# Patient Record
Sex: MALE | Race: WHITE
[De-identification: names, ages, dates, MRNs, and addresses within clinical notes are randomized per-mention and may not be internally consistent; named-entity substitution may affect disease eponyms.]

---

## 2018-12-15 ENCOUNTER — HOSPITAL ENCOUNTER (OUTPATIENT)
Dept: HOSPITAL 47 - EC | Age: 63
Setting detail: OBSERVATION
LOS: 1 days | Discharge: HOME | End: 2018-12-16
Attending: INTERNAL MEDICINE | Admitting: INTERNAL MEDICINE
Payer: MEDICARE

## 2018-12-15 VITALS — BODY MASS INDEX: 33.8 KG/M2

## 2018-12-15 DIAGNOSIS — Z83.3: ICD-10-CM

## 2018-12-15 DIAGNOSIS — Z98.890: ICD-10-CM

## 2018-12-15 DIAGNOSIS — Z88.8: ICD-10-CM

## 2018-12-15 DIAGNOSIS — Z82.3: ICD-10-CM

## 2018-12-15 DIAGNOSIS — I10: ICD-10-CM

## 2018-12-15 DIAGNOSIS — I49.3: ICD-10-CM

## 2018-12-15 DIAGNOSIS — Z79.899: ICD-10-CM

## 2018-12-15 DIAGNOSIS — R07.89: ICD-10-CM

## 2018-12-15 DIAGNOSIS — Z79.82: ICD-10-CM

## 2018-12-15 DIAGNOSIS — G47.30: ICD-10-CM

## 2018-12-15 DIAGNOSIS — R79.89: ICD-10-CM

## 2018-12-15 DIAGNOSIS — K21.9: Primary | ICD-10-CM

## 2018-12-15 DIAGNOSIS — Z91.19: ICD-10-CM

## 2018-12-15 DIAGNOSIS — S22.32XA: ICD-10-CM

## 2018-12-15 DIAGNOSIS — K29.70: ICD-10-CM

## 2018-12-15 LAB
ALBUMIN SERPL-MCNC: 4.1 G/DL (ref 3.5–5)
ALP SERPL-CCNC: 128 U/L (ref 38–126)
ALT SERPL-CCNC: 32 U/L (ref 21–72)
AMYLASE SERPL-CCNC: 83 U/L (ref 30–110)
ANION GAP SERPL CALC-SCNC: 9 MMOL/L
APTT BLD: 23.4 SEC (ref 22–30)
AST SERPL-CCNC: 33 U/L (ref 17–59)
BASOPHILS # BLD AUTO: 0 K/UL (ref 0–0.2)
BASOPHILS NFR BLD AUTO: 1 %
BUN SERPL-SCNC: 21 MG/DL (ref 9–20)
CALCIUM SPEC-MCNC: 9.6 MG/DL (ref 8.4–10.2)
CHLORIDE SERPL-SCNC: 107 MMOL/L (ref 98–107)
CK SERPL-CCNC: 219 U/L (ref 55–170)
CO2 SERPL-SCNC: 24 MMOL/L (ref 22–30)
D DIMER PPP FEU-MCNC: 5.5 MG/L FEU (ref ?–0.6)
EOSINOPHIL # BLD AUTO: 0.2 K/UL (ref 0–0.7)
EOSINOPHIL NFR BLD AUTO: 3 %
ERYTHROCYTE [DISTWIDTH] IN BLOOD BY AUTOMATED COUNT: 4.6 M/UL (ref 4.3–5.9)
ERYTHROCYTE [DISTWIDTH] IN BLOOD: 12.9 % (ref 11.5–15.5)
GLUCOSE SERPL-MCNC: 107 MG/DL (ref 74–99)
HCT VFR BLD AUTO: 41.5 % (ref 39–53)
HGB BLD-MCNC: 13.3 GM/DL (ref 13–17.5)
INR PPP: 0.9 (ref ?–1.2)
LIPASE SERPL-CCNC: 56 U/L (ref 23–300)
LYMPHOCYTES # SPEC AUTO: 0.9 K/UL (ref 1–4.8)
LYMPHOCYTES NFR SPEC AUTO: 13 %
MAGNESIUM SPEC-SCNC: 1.9 MG/DL (ref 1.6–2.3)
MCH RBC QN AUTO: 29 PG (ref 25–35)
MCHC RBC AUTO-ENTMCNC: 32.1 G/DL (ref 31–37)
MCV RBC AUTO: 90.2 FL (ref 80–100)
MONOCYTES # BLD AUTO: 0.5 K/UL (ref 0–1)
MONOCYTES NFR BLD AUTO: 7 %
NEUTROPHILS # BLD AUTO: 5.3 K/UL (ref 1.3–7.7)
NEUTROPHILS NFR BLD AUTO: 74 %
PLATELET # BLD AUTO: 200 K/UL (ref 150–450)
POTASSIUM SERPL-SCNC: 4.6 MMOL/L (ref 3.5–5.1)
PROT SERPL-MCNC: 7.7 G/DL (ref 6.3–8.2)
PT BLD: 9.8 SEC (ref 9–12)
SODIUM SERPL-SCNC: 140 MMOL/L (ref 137–145)
TROPONIN I SERPL-MCNC: <0.012 NG/ML (ref 0–0.03)
WBC # BLD AUTO: 7.1 K/UL (ref 3.8–10.6)

## 2018-12-15 PROCEDURE — 83735 ASSAY OF MAGNESIUM: CPT

## 2018-12-15 PROCEDURE — 96365 THER/PROPH/DIAG IV INF INIT: CPT

## 2018-12-15 PROCEDURE — 85610 PROTHROMBIN TIME: CPT

## 2018-12-15 PROCEDURE — 85025 COMPLETE CBC W/AUTO DIFF WBC: CPT

## 2018-12-15 PROCEDURE — 96366 THER/PROPH/DIAG IV INF ADDON: CPT

## 2018-12-15 PROCEDURE — 85379 FIBRIN DEGRADATION QUANT: CPT

## 2018-12-15 PROCEDURE — 36415 COLL VENOUS BLD VENIPUNCTURE: CPT

## 2018-12-15 PROCEDURE — 82553 CREATINE MB FRACTION: CPT

## 2018-12-15 PROCEDURE — 84484 ASSAY OF TROPONIN QUANT: CPT

## 2018-12-15 PROCEDURE — 82150 ASSAY OF AMYLASE: CPT

## 2018-12-15 PROCEDURE — 71046 X-RAY EXAM CHEST 2 VIEWS: CPT

## 2018-12-15 PROCEDURE — 80053 COMPREHEN METABOLIC PANEL: CPT

## 2018-12-15 PROCEDURE — 83880 ASSAY OF NATRIURETIC PEPTIDE: CPT

## 2018-12-15 PROCEDURE — 82550 ASSAY OF CK (CPK): CPT

## 2018-12-15 PROCEDURE — 83690 ASSAY OF LIPASE: CPT

## 2018-12-15 PROCEDURE — 96376 TX/PRO/DX INJ SAME DRUG ADON: CPT

## 2018-12-15 PROCEDURE — 85730 THROMBOPLASTIN TIME PARTIAL: CPT

## 2018-12-15 PROCEDURE — 93005 ELECTROCARDIOGRAM TRACING: CPT

## 2018-12-15 PROCEDURE — 99285 EMERGENCY DEPT VISIT HI MDM: CPT

## 2018-12-15 PROCEDURE — 80061 LIPID PANEL: CPT

## 2018-12-15 PROCEDURE — 71275 CT ANGIOGRAPHY CHEST: CPT

## 2018-12-15 NOTE — XR
EXAMINATION TYPE: XR chest 2V

 

DATE OF EXAM: 12/15/2018

 

COMPARISON: NONE

 

HISTORY: Chest pain

 

TECHNIQUE:  Frontal and lateral views of the chest are obtained.

 

FINDINGS:  There is large hiatal hernia. There is no heart failure nor confluent pneumonic infiltrate
. There are chest leads. There is right shoulder prosthesis. There is spurring in the thoracic spine.
 Costophrenic angles are clear.

 

IMPRESSION:  No active cardiopulmonary disease.

## 2018-12-15 NOTE — ED
Chest Pain HPI





- General


Chief Complaint: Chest Pain


Stated Complaint: chest pain/high BP


Time Seen by Provider: 12/15/18 17:48


Source: patient, RN notes reviewed


Mode of arrival: ambulatory


Limitations: no limitations





- History of Present Illness


Initial Comments: 





This is a 63-year-old male who states he had the onset around 4 PM today of 

midsternal chest pain 5/10 severity dull in nature does not seem to get worse 

with movement or deep breathing.  He states he has similar episode last evening 

and lasted for quite a while then


 resolved spontaneously.  He denies any shortness of breath fevers chills 

nausea vomiting sweats or other symptoms.  Of note he does have a recent left 

12th rib fracture from a fall while cutting wood also had right shoulder 

surgery done recently.  No cough or phlegm production or other modifying 

factors at this time


MD Complaint: chest pain





- Related Data


 Home Medications











 Medication  Instructions  Recorded  Confirmed


 


Albuterol Inhaler [Ventolin Hfa 2 puff INHALATION RT-Q8H 12/15/18 12/15/18





Inhaler]   


 


Ascorbic Acid [Vitamin C] 500 mg PO DAILY 12/15/18 12/15/18


 


Aspirin [[place] Aspirin EC] 81 mg PO DAILY 12/15/18 12/15/18


 


Ferrous Sulfate [Feosol] 325 mg PO DAILY 12/15/18 12/15/18


 


Fish Oil/Dha/Epa [Fish Oil 1,200 2,400 mg PO DAILY 12/15/18 12/15/18





mg Fish Oil]   


 


Lisinopril 20 mg PO DAILY 12/15/18 12/15/18


 


Multivitamins, Thera [Multivitamin 1 tab PO DAILY 12/15/18 12/15/18





(formulary)]   


 


Ranitidine HCl [Zantac] 75 mg PO DAILY 12/15/18 12/15/18











 Allergies











Allergy/AdvReac Type Severity Reaction Status Date / Time


 


atorvastatin [From Lipitor] AdvReac  Unknown Verified 12/15/18 18:50














Review of Systems


ROS Statement: 


Those systems with pertinent positive or pertinent negative responses have been 

documented in the HPI.





ROS Other: All systems not noted in ROS Statement are negative.





EKG Findings





- EKG Results:


EKG: interpreted by ANA, sinus rhythm (Sinus rhythm a 64.  VA interval 182 QRS 

duration 102 QT since /418 minimal voltage criteria for LVH no acute ST-

T wave changes)





Past Medical History


Past Medical History: Hypertension


History of Any Multi-Drug Resistant Organisms: None Reported


Additional Past Surgical History / Comment(s): shoulder replacement- 5 weeks 

ago.  broken rib.


Past Psychological History: No Psychological Hx Reported


Smoking Status: Never smoker


Past Alcohol Use History: Occasional


Past Drug Use History: None Reported





General Exam





- General Exam Comments


Initial Comments: 





This is a well-developed well-nourished awake alert oriented 3 male


Limitations: no limitations


General appearance: alert, in no apparent distress


Head exam: Present: atraumatic, normocephalic, normal inspection


Eye exam: Present: normal appearance, PERRL, EOMI.  Absent: scleral icterus, 

conjunctival injection, periorbital swelling


ENT exam: Present: normal exam, mucous membranes moist


Neck exam: Present: normal inspection.  Absent: tenderness, meningismus, 

lymphadenopathy


Respiratory exam: Present: normal lung sounds bilaterally, chest wall 

tenderness (Chest wall tenderness or left lower chest wall no definite 

reproducibility of anterior chest wall discomfort).  Absent: respiratory 

distress, wheezes, rales, rhonchi, stridor


Cardiovascular Exam: Present: regular rate, normal rhythm, normal heart sounds.

  Absent: systolic murmur, diastolic murmur, rubs, gallop, clicks


GI/Abdominal exam: Present: soft, normal bowel sounds.  Absent: distended, 

tenderness, guarding, rebound, rigid


Extremities exam: Present: normal inspection, full ROM, normal capillary 

refill.  Absent: tenderness, pedal edema, joint swelling, calf tenderness


Back exam: Present: normal inspection


Neurological exam: Present: alert, oriented X3, CN II-XII intact


Psychiatric exam: Present: normal affect, normal mood


Skin exam: Present: warm, dry, intact, normal color.  Absent: rash





Course


 Vital Signs











  12/15/18 12/15/18





  17:39 20:38


 


Temperature 97 F L 


 


Pulse Rate 65 66


 


Respiratory 16 18





Rate  


 


Blood Pressure 169/91 156/94


 


O2 Sat by Pulse 98 97





Oximetry  














Chest Pain MDM





- MDM





I did review the imaging and report no acute findings his d-dimer was elevated 

a CAT scan was performed and no evidence of a pulmonary embolus.  I did a long 

discussion with him and his family.  He had recurrent chest pain it is 

suspicious for cardiac no the initial workup was negative he will be admitted 

for evaluation I did discuss the case with the hospitalist.





Disposition


Clinical Impression: 


 Atypical chest pain, Chest pain, Unstable angina pectoris





Disposition: ADMITTED AS IP TO THIS HOSP


Condition: Stable


Referrals: 


Nonstaff,Physician [Primary Care Provider] - 1-2 days

## 2018-12-15 NOTE — CT
EXAMINATION TYPE: CT angio chest

 

DATE OF EXAM: 12/15/2018 7:42 PM

 

COMPARISON: None

 

HISTORY: Chest pain.

 

CT DLP: 626 mGycm

Automated exposure control for dose reduction was used.

 

CONTRAST: 

CTA scan of the thorax is performed with IV Contrast, patient injected with 80ml mL of Isovue 370, pu
lmonary embolism protocol.  There are 3-D post processed images..  

 

FINDINGS:

 

There is a large hiatal hernia. There is no pericardial effusion. Heart is slightly enlarged. There i
s no pleural effusion. There are no hilar masses. There is no mediastinal adenopathy. There is normal
 contrast opacification of the pulmonary arteries. I see no filling defect. There is a right shoulder
 prosthesis. Thoracic spine is intact. Thoracic aorta is intact. There is no evidence of aneurysm or 
dissection. Ascending aorta measures 3.8 cm.

 

 

IMPRESSION: 

NO EVIDENCE OF PULMONARY EMBOLISM. NO PULMONARY INFILTRATES. LARGE HIATAL HERNIA.

## 2018-12-16 VITALS
SYSTOLIC BLOOD PRESSURE: 129 MMHG | TEMPERATURE: 98.1 F | RESPIRATION RATE: 16 BRPM | DIASTOLIC BLOOD PRESSURE: 86 MMHG | HEART RATE: 60 BPM

## 2018-12-16 LAB
CHOLEST SERPL-MCNC: 208 MG/DL (ref ?–200)
CK SERPL-CCNC: 136 U/L (ref 55–170)
CK SERPL-CCNC: 184 U/L (ref 55–170)
HDLC SERPL-MCNC: 48 MG/DL (ref 40–60)
LDLC SERPL CALC-MCNC: 136 MG/DL (ref 0–99)
TRIGL SERPL-MCNC: 120 MG/DL (ref ?–150)
TROPONIN I SERPL-MCNC: <0.012 NG/ML (ref 0–0.03)
TROPONIN I SERPL-MCNC: <0.012 NG/ML (ref 0–0.03)

## 2018-12-16 RX ADMIN — ISODIUM CHLORIDE SCH: 0.03 SOLUTION RESPIRATORY (INHALATION) at 08:12

## 2018-12-16 RX ADMIN — PANTOPRAZOLE SODIUM SCH MG: 40 TABLET, DELAYED RELEASE ORAL at 09:10

## 2018-12-16 RX ADMIN — ISODIUM CHLORIDE SCH: 0.03 SOLUTION RESPIRATORY (INHALATION) at 00:00

## 2018-12-16 RX ADMIN — PANTOPRAZOLE SODIUM SCH MG: 40 TABLET, DELAYED RELEASE ORAL at 00:11

## 2018-12-16 NOTE — P.HPIM
History of Present Illness


H&P Date: 12/15/18


Chief Complaint: Epigastric discomfort and pain





63-year-old male with history of hypertension.





Patient presented to the hospital due to epigastric abdominal pain.  He reports 

that he had some epigastric discomfort the night before but that resolved on 

its own however on day of presentation at around 4 PM he had epigastric 

abdominal pain sharp in nature rated at 5 out of 10 in severity currently is 3 

out of 10 in severity.  Nonradiating not associated with any shortness of 

breath dizziness lightheadedness sweating.  Patient denies any anginal symptoms 

in the past.  Patient denies any cardiac history.  Patient denies any smoking.  

Patient reports that both incidents when he had the pain he was doing nothing 

to sitting in chair watching TV.  


Patient reports that 5 weeks ago he had rotator cuff surgery on his right 

shoulder and recently he fell while chopping wide and broke his left 12th rib 

since then he's been taking oxycodone and Motrin.  He admits that he takes 

Motrin on empty stomach and he associates the pain that started today at 4 PM 

with Motrin that he took within half an hour before the pain started.


In the ED initial EKG was unremarkable.  Patient was started from the ED on 

heparin and nitro and admitted under observation.


During my interview and during my exam I noticed regular irregularity on heart 

exam.  When I checked the monitor patient is having PVCs every third beat 

currently asymptomatic.





Review of Systems





 








Pertinent positives as noted in HPI. All other systems were reviewed and are 

negative 





Past Medical History


Past Medical History: Hypertension


Additional Past Medical History / Comment(s): Left 12th rib fracture, recent 

rotator cuff surgery on the right shoulder 5 weeks ago


History of Any Multi-Drug Resistant Organisms: None Reported


Additional Past Surgical History / Comment(s): shoulder replacement- 5 weeks 

ago.  broken rib.


Past Psychological History: No Psychological Hx Reported


Smoking Status: Never smoker


Past Alcohol Use History: Occasional


Past Drug Use History: None Reported





- Past Family History


  ** Father


Family Medical History: CVA/TIA





  ** Mother


Family Medical History: Diabetes Mellitus





Medications and Allergies


 Home Medications











 Medication  Instructions  Recorded  Confirmed  Type


 


Albuterol Inhaler [Ventolin Hfa 2 puff INHALATION RT-Q8H 12/15/18 12/15/18 

History





Inhaler]    


 


Ascorbic Acid [Vitamin C] 500 mg PO DAILY 12/15/18 12/15/18 History


 


Aspirin [[place] Aspirin EC] 81 mg PO DAILY 12/15/18 12/15/18 History


 


Ferrous Sulfate [Feosol] 325 mg PO DAILY 12/15/18 12/15/18 History


 


Fish Oil/Dha/Epa [Fish Oil 1,200 2,400 mg PO DAILY 12/15/18 12/15/18 History





mg Fish Oil]    


 


Lisinopril 20 mg PO DAILY 12/15/18 12/15/18 History


 


Multivitamins, Thera [Multivitamin 1 tab PO DAILY 12/15/18 12/15/18 History





(formulary)]    


 


Ranitidine HCl [Zantac] 75 mg PO DAILY 12/15/18 12/15/18 History











 Allergies











Allergy/AdvReac Type Severity Reaction Status Date / Time


 


atorvastatin [From Lipitor] AdvReac  Unknown Verified 12/15/18 21:59














Physical Exam


Vitals: 


 Vital Signs











  Temp Pulse Resp BP Pulse Ox


 


 12/15/18 20:38   66  18  156/94  97


 


 12/15/18 17:39  97 F L  65  16  169/91  98








 Intake and Output











 12/15/18 12/15/18 12/15/18





 06:59 14:59 22:59


 


Other:   


 


  Weight   95.254 kg














Constitutional:          No acute distress, conversant, pleasant


Eyes:      Anicteric sclerae, moist conjunctiva, no lid-lag


         Pupils equal round reactive to light





ENMT:      NC/AT


         Oropharynx clear, no erythema, exudates





Neck:      Supple, FROM, no masses, or JVD


         No carotid bruits


         No thyromegaly





Lungs:      Clear to auscultation


         Clear to percussion


         Normal respiratory effort, no accessory muscle use 





Cardiovascular:      Heart regular irregularty, cardiac monitoring is showing 

frequent PVCs every third beat 


         No murmurs, gallops, or rubs


         No peripheral edema





Abdominal:       Soft


         Nontender, no guarding, rebound or rigidity


         Abdomen moving with respiration


         Normoactive bowel sounds


         No hepatomegaly, No splenomegaly


         No palpable mass 


         No abdominal wall hernia noted 





Skin:      Normal temperature, tone, texture, turgor


         No induration


         No subcutaneous nodules 


         No rash, lesions


         No ulcers





Extremities:      No digital cyanosis 


         No clubbing


         Pedal pulses intact and symmetrical


         Radial pulses intact and symmetrical 


         No calf tenderness 





Psychiatric:      Alert and oriented to person, place and time


         Appropriate affect


         fair judgment   


      


Neuro      Muscles Strength 5/5 in all 4 extremities 


         Sensation to light touch grossly present throughout


         Cranial nerves II-XII grossly intact


         No focal sensory deficits


Lymphatics:       no palpable cervical or supraclavicular , or inguinal lymph 

nodes  





Results


CBC & Chem 7: 


 12/15/18 18:00





 12/15/18 18:00


Labs: 


 Abnormal Lab Results - Last 24 Hours (Table)











  12/15/18 12/15/18 12/15/18 Range/Units





  18:00 18:00 18:00 


 


Lymphocytes #    0.9 L  (1.0-4.8)  k/uL


 


D-Dimer     (<0.60)  mg/L FEU


 


BUN  21 H    (9-20)  mg/dL


 


Glucose  107 H    (74-99)  mg/dL


 


Alkaline Phosphatase  128 H    ()  U/L


 


Total Creatine Kinase   219 H   ()  U/L














  12/15/18 Range/Units





  18:00 


 


Lymphocytes #   (1.0-4.8)  k/uL


 


D-Dimer  5.50 H  (<0.60)  mg/L FEU


 


BUN   (9-20)  mg/dL


 


Glucose   (74-99)  mg/dL


 


Alkaline Phosphatase   ()  U/L


 


Total Creatine Kinase   ()  U/L














Assessment and Plan


Assessment: 





63-year-old male with history of hypertension admitted as an observation with 

anticipated length of stay less than 48 hours with epigastric abdominal pain 

rule out acute coronary syndrome.  Patient risk factors are his age being a 

male and hypertension.  He indicated that 5 weeks ago he had rotator cuff 

surgery and recently had a fall and fractured the 12th rib on the left side for 

which she's been taking oxycodone and Motrins.  He admits that he takes Motrins 

at on empty stomach and he just took a Motrin within half hour of his 

epigastric discomfort.  He otherwise denies any GI bleeding.  Initial workup 

was negative, cardiac monitoring is showing frequent PVCs his potassium and 

magnesium unremarkable patient is asymptomatic at this time


Patient denies any cardiac history, denies any anginal symptoms


Plan: 





Epigastric abdominal pain most likely secondary to gastritis secondary to using 

Motrin on empty stomach


Rule out ACS secondary to following risk factors patient is a male, 63 years old

, hypertensive.


Initial EKG unremarkable


Cardiac monitoring now showing frequent PVCs asymptomatic, potassium and 

magnesium are unremarkable


Cardiac enzyme monitoring


Cardiac monitoring


Continue home meds


Nitro paste and heparin drip from the ED


Aspirin and statin


Check lipid profile


Cardiology consult


PPI twice a day





Hypertensive currently controlled


Continue lisinopril





DVT prophylaxis


Patient currently on heparin drip as above


 


Surrogate decision-maker: Patient's friend Pedro


CODE STATUS: Full code 


Discussed with: Patient, ER, RN


Anticipated discharge: <48  hours


Anticipated discharge place: Home


A total of 60 minutes was spent on the care of this complex patient more than 50

% of the time was spent in counseling and care coordination.

## 2018-12-16 NOTE — P.DS
Providers


Date of admission: 


12/15/18 20:51





Expected date of discharge: 12/16/18


Attending physician: 


Hari Pepe MD





Consults: 





 





12/15/18 20:51


Consult Physician Urgent 


   Consulting Provider: Emiliano Pineda


   Consult Reason/Comments: Chest pain


   Do you want consulting provider notified?: Yes











Primary care physician: 


Physician Nonstaff








- Discharge Diagnosis(es)


(1) Hypertension


Current Visit: Yes   Status: Acute   





(2) GERD (gastroesophageal reflux disease)


Current Visit: Yes   Status: Acute   





(3) Rib fracture


Current Visit: Yes   Status: Acute   





(4) Atypical chest pain


Current Visit: Yes   Status: Acute   


Hospital Course: 





63-year-old male with history of hypertension.





Patient presented to the hospital due to epigastric abdominal pain.  He reports 

that he had some epigastric discomfort the night before but that resolved on 

its own. However on day of presentation at around 4 PM he had epigastric 

abdominal pain, sharp in nature, rated at 5 out of 10 in severity, currently is 

3 out of 10 in severity.  Nonradiating, not associated with any shortness of 

breath, dizziness, lightheadedness, or sweating.  Patient denies any anginal 

symptoms in the past.  Patient denies any cardiac history.  Patient denies any 

smoking.  Patient reports that both incidents when he had the pain he was doing 

nothing but sitting in chair watching TV.  


Patient reports that 5 weeks ago he had rotator cuff surgery on his right 

shoulder and recently he fell and broke his left 12th rib. Since then he's been 

taking oxycodone and Motrin.  He admits that he takes Motrin on empty stomach 

and he associates the pain that started today at 4 PM with Motrin that he took 

within half an hour before the pain started.


In the ED initial EKG was unremarkable.  Patient was started from the ED on 

heparin and nitro and admitted under observation.





With regard to his chest pain, it appeared atypical likely secondary to GERD, 

plan was to rule out acute coronary syndrome.  Troponin was less than 0.0123, 

EKG showing normal sinus rhythm.  His d-dimer was initially elevated, but CTA 

of the chest ruled out PE.  Cardiology was consulted at this time and 

recommended that the patient be discharged with a stress test in the outpatient 

setting.





Patient was seen and examined prior to discharge.  No acute events overnight.  

Patient reports complete resolution of his chest pain.  No shortness of breath 

or palpitations.  No nausea or vomiting.  Patient is looking for to going home.





General: [non toxic], [no distress], [appears at stated age]


Derm: [warm], [dry]


Head: [atraumatic], [normocephalic], [symmetric]


Eyes: [EOMI], [no lid lag], [anicteric sclera]


Mouth: [no lip lesion], [mucus membranes moist]


Cardiovascular: [S1S2 reg], [no murmur], [positive posterior tibial pulse 

bilateral], 


Lungs: [CTA bilateral], [no rhonchi, no rales] , [no accessory muscle use]


Abdominal: [soft], [ nontender to palpation], [no guarding], [no appreciable 

organomegaly]


Ext: [no gross muscle atrophy], [no edema], [no contractures]


Neuro:  [no focal neuro deficits]


Psych: [Alert], [oriented], [appropriate affect] 





Acute coronary syndrome has been ruled out.  He has been cleared for discharge 

by cardiology. Patient was advised follow-up with his primary care provider 

within 1-2 days of discharge.  Patient has been advised to undergo cardiac 

stress test through his primary care provider.





Pertinent Studies: 





Chest CTA


Patient Condition at Discharge: Stable





Plan - Discharge Summary


New Discharge Prescriptions: 


New


   Aspirin 81 mg PO DAILY  chew


   Esomeprazole Magnesium [NexIUM] 20 mg PO DAILY #30 cap





Continue


   Multivitamins, Thera [Multivitamin (formulary)] 1 tab PO DAILY


   Lisinopril 20 mg PO DAILY


   Fish Oil/Dha/Epa [Fish Oil 1,200 mg Fish Oil] 2,400 mg PO DAILY


   Ferrous Sulfate [Feosol] 325 mg PO DAILY


   Aspirin [[place] Aspirin EC] 81 mg PO DAILY


   Ascorbic Acid [Vitamin C] 500 mg PO DAILY


   Albuterol Inhaler [Ventolin Hfa Inhaler] 2 puff INHALATION RT-Q8H





Discontinued


   Ranitidine HCl [Zantac] 75 mg PO DAILY


Discharge Medication List





Albuterol Inhaler [Ventolin Hfa Inhaler] 2 puff INHALATION RT-Q8H 12/15/18 [

History]


Ascorbic Acid [Vitamin C] 500 mg PO DAILY 12/15/18 [History]


Aspirin [[place] Aspirin EC] 81 mg PO DAILY 12/15/18 [History]


Ferrous Sulfate [Feosol] 325 mg PO DAILY 12/15/18 [History]


Fish Oil/Dha/Epa [Fish Oil 1,200 mg Fish Oil] 2,400 mg PO DAILY 12/15/18 [

History]


Lisinopril 20 mg PO DAILY 12/15/18 [History]


Multivitamins, Thera [Multivitamin (formulary)] 1 tab PO DAILY 12/15/18 [History

]


Aspirin 81 mg PO DAILY  chew 12/16/18 [Rx]


Esomeprazole Magnesium [NexIUM] 20 mg PO DAILY #30 cap 12/16/18 [Rx]








Follow up Appointment(s)/Referral(s): 


Nonstaff,Physician [Primary Care Provider] - 1-2 days


Activity/Diet/Wound Care/Special Instructions: 


Diet; HEART healthy


Please follow-up with your primary care provider within 1-2 days of discharge.


Please take all medications as advised.


Please obtain a cardiac stress test through your primary care provider.


Discharge Disposition: HOME SELF-CARE

## 2018-12-16 NOTE — P.CRDCN
History of Present Illness


History of present illness: 





This is a pleasant 63-year-old  male past medical history significant 

for hypertension and recent fall while chopping wood that caused a left rib 

fracture. He denies history of coronary artery disease, dyslipidemia or 

diabetes mellitus. He lives in [place] area. He presented to the ED with 

complaints of abdominal discomfort that started Friday night while he was 

having an alcoholic beverage. At that time it was an achy pain in the abdomen 

that ultimately subsided on its own. Again yesterday he was feeling an achy 

burning sensation in the abdomen near the umbillicus. He denies chest pain, 

shortness of breath, dizziness or palpitations.  He states at times there has 

been a burning sensation in the midsternal epigastric region as well.  At the 

time of my exam he is seen resting comfortably in bed with his significant 

other at the bedside.  He continues to complain of a dull achy sensation in the 

abdomen that seems to be mildly worse with palpation.  He denies any symptoms 

of chest discomfort. He states he has been taking ibuprofen 800 mg every 6 

hours for the previous 2 weeks secondary to his rib fracture.





EKG reveals sinus mechanism with no acute ST or T-wave abnormalities.


Chest x-ray is negative for an acute cardiopulmonary process.


Laboratory data reviewed, hhg 13.3, plt 200, WBC 7.1, d-dimer 5.5, sodium 140, 

potassium 4.6, magnesium 1.9, creatinine 1.19, cardiac enzymes negative 3, LDL 

136, HDL 48.


Current cardiac medications include lisinopril 20 mg daily and aspirin 81 mg 

daily.





At the time of my exam:


CONSTITUTIONAL: Denies fever. Denies chills.


EYES: Denies blurred vision. Denies vision changes. Denies eye pain.


EARS, NOSE, MOUTH & THROAT: Denies headache. Denies sore throat. Denies ear 

pain.


CARDIOVASCULAR: Denies chest pain. Denies shortness of breath. Denies 

orthopnea. Denies PND. Denies palpitations.


RESPIRATORY: Denies cough. 


GASTROINTESTINAL:complains of dull abdominal pain. Denies diarrhea. Denies 

constipation. Denies nausea. Denies vomiting.


MUSCULOSKELETAL: Denies myalgias.


INTEGUMENTARY: Denies pruitis. Denies rash.


NEUROLOGIC: Denies numbness. Denies tingling. Denies weakness.


PSYCHIATRIC: Denies anxiety. Denies depression.


ENDOCRINE: Denies fatigue. Denies weight change. Denies polydipsia. Denies 

polyurina.


GENITOURINARY: Denies burning, hematuria or urgency with micturation.


HEMATOLOGIC: Denies history of anemia. Denies bleeding. 





blood pressure 143/92 heart rate 61 afebrile maintaining oxygen saturation on 

room air


GENERAL: This is a 63-year-old  male in no apparent distress at the 

time of my examination.


HEENT: Head is atraumatic, normocephalic. Pupils are equal, round. Sclerae 

anicteric. Conjunctivae are clear. Mucous membranes of the mouth are moist. 

Neck is supple. There is no jugular venous distention. No carotid bruit is 

heard.


LUNGS: Clear to auscultation no wheezes, rales or rhonchi. No chest wall 

tenderness is noted on palpation or with deep breathing.


HEART: Regular rate and rhythm without murmurs, rubs or gallops. S1 and S2 

heard.


ABDOMEN: Soft, nontender. Mild discomfort near umbillicus on palpation. Bowel 

sounds are heard. No organomegaly noted.


EXTREMITIES: No evidence of peripheral edema and no calf tenderness noted.


VASCULAR: Radial and dorsalis pedis pulses palpated, no evidence of clubbing.  


NEUROLOGIC: Patient is awake, alert and oriented x3.


 


ASSESSMENT


Epigastric pain, an acute coronary event has been ruled out with no EKG 

evidence of ischemia and negative cardiac enzymes. 


Hypertension


Sleep apnea, non-compliant with CPAP


Recent 12th left rib fracture





PLAN


An acute coronary event has been ruled out with no EKG evidence of ischemia and 

negative cardiac enzymes.


Recommend compliance with CPAP for sleep apnea. Advised him of bradycardia 

while sleeping. 


He may be discharged home to follow up with his PCP for outpatient stress 

testing. This has been discussed with him in detail and he is agreeable. 


Ongoing medical management of probably gastritis secondary to NSAID use. 


Thank you kindly for this consultation. 





Nurse Practitioner note has been reviewed, I agree with a documented findings 

and plan of care.  Patient was seen and examined.








Past Medical History


Past Medical History: Hypertension


Additional Past Medical History / Comment(s): Left 12th rib fracture, recent 

rotator cuff surgery on the right shoulder 5 weeks ago


History of Any Multi-Drug Resistant Organisms: None Reported


Additional Past Surgical History / Comment(s): shoulder replacement- 5 weeks 

ago.  broken rib.


Past Anesthesia/Blood Transfusion Reactions: No Reported Reaction


Past Psychological History: No Psychological Hx Reported


Smoking Status: Never smoker


Past Alcohol Use History: Occasional


Past Drug Use History: None Reported





- Past Family History


  ** Father


Family Medical History: CVA/TIA





  ** Mother


Family Medical History: Diabetes Mellitus





Medications and Allergies


 Home Medications











 Medication  Instructions  Recorded  Confirmed  Type


 


Albuterol Inhaler [Ventolin Hfa 2 puff INHALATION RT-Q8H 12/15/18 12/15/18 

History





Inhaler]    


 


Ascorbic Acid [Vitamin C] 500 mg PO DAILY 12/15/18 12/15/18 History


 


Aspirin [Simla Aspirin EC] 81 mg PO DAILY 12/15/18 12/15/18 History


 


Ferrous Sulfate [Feosol] 325 mg PO DAILY 12/15/18 12/15/18 History


 


Fish Oil/Dha/Epa [Fish Oil 1,200 2,400 mg PO DAILY 12/15/18 12/15/18 History





mg Fish Oil]    


 


Lisinopril 20 mg PO DAILY 12/15/18 12/15/18 History


 


Multivitamins, Thera [Multivitamin 1 tab PO DAILY 12/15/18 12/15/18 History





(formulary)]    


 


Ranitidine HCl [Zantac] 75 mg PO DAILY 12/15/18 12/15/18 History











 Allergies











Allergy/AdvReac Type Severity Reaction Status Date / Time


 


atorvastatin [From Lipitor] AdvReac  Unknown Verified 12/15/18 21:59














Physical Exam


Vitals: 


 Vital Signs











  Temp Pulse Pulse Resp BP BP Pulse Ox


 


 12/16/18 08:00  98.3 F   61  18   143/92  97


 


 12/16/18 04:00  98.6 F   60  18   122/63  99


 


 12/16/18 00:00     18   


 


 12/15/18 23:51  97.7 F   60  18   146/90  97


 


 12/15/18 22:33     18   


 


 12/15/18 22:18  97.6 F   78  18   177/93  98


 


 12/15/18 21:42  98.0 F  67   17  163/96   98


 


 12/15/18 20:38   66   18  156/94   97


 


 12/15/18 17:39  97 F L  65   16  169/91   98








 Intake and Output











 12/15/18 12/16/18 12/16/18





 22:59 06:59 14:59


 


Intake Total  75.167 


 


Balance  75.167 


 


Intake:   


 


  Intake, IV Titration  75.167 





  Amount   


 


    Heparin Sod,Pork in 0.45%  75.167 





    NaCl 25,000 unit In 0.45   





    % NaCl 1 250ml.bag @ 10   





    mls/hr IV .Q24H [facility] Rx#:   





    [id number]   


 


Other:   


 


  Voiding Method   Toilet


 


  # Voids  1 


 


  Weight 95.2 kg  














Results





 12/15/18 18:00





 12/15/18 18:00


 Cardiac Enzymes











  12/15/18 12/15/18 12/15/18 Range/Units





  18:00 18:00 23:21 


 


AST  33    (17-59)  U/L


 


CK-MB (CK-2)   2.2  1.6  (0.0-2.4)  ng/mL


 


Troponin I   <0.012  <0.012  (0.000-0.034)  ng/mL














  12/16/18 Range/Units





  04:41 


 


AST   (17-59)  U/L


 


CK-MB (CK-2)  1.2  (0.0-2.4)  ng/mL


 


Troponin I  <0.012  (0.000-0.034)  ng/mL








 Coagulation











  12/15/18 12/16/18 Range/Units





  18:00 04:41 


 


PT  9.8   (9.0-12.0)  sec


 


APTT  23.4  34.5 H  (22.0-30.0)  sec








 Lipids











  12/16/18 Range/Units





  04:41 


 


Triglycerides  120  (<150)  mg/dL


 


Cholesterol  208 H  (<200)  mg/dL


 


HDL Cholesterol  48  (40-60)  mg/dL








 CBC











  12/15/18 Range/Units





  18:00 


 


WBC  7.1  (3.8-10.6)  k/uL


 


RBC  4.60  (4.30-5.90)  m/uL


 


Hgb  13.3  (13.0-17.5)  gm/dL


 


Hct  41.5  (39.0-53.0)  %


 


Plt Count  200  (150-450)  k/uL








 Comprehensive Metabolic Panel











  12/15/18 Range/Units





  18:00 


 


Sodium  140  (137-145)  mmol/L


 


Potassium  4.6  (3.5-5.1)  mmol/L


 


Chloride  107  ()  mmol/L


 


Carbon Dioxide  24  (22-30)  mmol/L


 


BUN  21 H  (9-20)  mg/dL


 


Creatinine  1.19  (0.66-1.25)  mg/dL


 


Glucose  107 H  (74-99)  mg/dL


 


Calcium  9.6  (8.4-10.2)  mg/dL


 


AST  33  (17-59)  U/L


 


ALT  32  (21-72)  U/L


 


Alkaline Phosphatase  128 H  ()  U/L


 


Total Protein  7.7  (6.3-8.2)  g/dL


 


Albumin  4.1  (3.5-5.0)  g/dL








 Current Medications











Generic Name Dose Route Start Last Admin





  Trade Name Freq  PRN Reason Stop Dose Admin


 


Albuterol Sulfate  2.5 mg  12/16/18 00:00  12/16/18 08:12





  Ventolin Nebulized  INHALATION   Not Given





  RT-Q8H [facility]   





     





     





     





     


 


Ascorbic Acid  500 mg  12/16/18 12:00  12/16/18 09:10





  Vitamin C  PO   500 mg





  1200 ESTELITA   Administration





     





     





     





     


 


Aspirin  81 mg  12/17/18 09:00  





  Aspirin  PO   





  DAILY [facility]   





     





     





     





     


 


Heparin Sodium (Porcine)  5,000 unit  12/16/18 21:00  





  Heparin  SQ   





  Q12HR [facility]   





     





     





     





     


 


Lisinopril  20 mg  12/16/18 09:00  12/16/18 09:10





  Zestril  PO   20 mg





  DAILY ESTELITA   Administration





     





     





     





     


 


Melatonin  6 mg  12/15/18 23:45  12/16/18 00:11





  Melatonin  PO   6 mg





  HS ESTELITA   Administration





     





     





     





     


 


Multivitamins  1 each  12/16/18 12:00  12/16/18 09:10





  Theragran  PO   1 each





  1200 ESTELITA   Administration





     





     





     





     


 


Naloxone HCl  0.2 mg  12/16/18 01:19  





  Narcan  IV   





  Q2M PRN   





  Opioid Reversal   





     





     





     


 


Nitroglycerin  0.4 mg  12/15/18 20:51  





  Nitrostat  SUBLINGUAL   





  Q5M PRN   





  Chest Pain   





     





     





     


 


Pantoprazole Sodium  40 mg  12/15/18 23:45  12/16/18 09:10





  Protonix  PO   40 mg





  AC-BID ESTELITA   Administration





     





     





     





     








 Intake and Output











 12/15/18 12/16/18 12/16/18





 22:59 06:59 14:59


 


Intake Total  75.167 


 


Balance  75.167 


 


Intake:   


 


  Intake, IV Titration  75.167 





  Amount   


 


    Heparin Sod,Pork in 0.45%  75.167 





    NaCl 25,000 unit In 0.45   





    % NaCl 1 250ml.bag @ 10   





    mls/hr IV .Q24H [facility] Rx#:   





    [id number]   


 


Other:   


 


  Voiding Method   Toilet


 


  # Voids  1 


 


  Weight 95.2 kg  








 





 12/15/18 18:00 





 12/15/18 18:00